# Patient Record
Sex: FEMALE | Race: BLACK OR AFRICAN AMERICAN | NOT HISPANIC OR LATINO | URBAN - NONMETROPOLITAN AREA
[De-identification: names, ages, dates, MRNs, and addresses within clinical notes are randomized per-mention and may not be internally consistent; named-entity substitution may affect disease eponyms.]

---

## 2020-06-02 ENCOUNTER — IMPORTED ENCOUNTER (OUTPATIENT)
Dept: URBAN - NONMETROPOLITAN AREA CLINIC 1 | Facility: CLINIC | Age: 70
End: 2020-06-02

## 2020-06-02 PROBLEM — Z96.1: Noted: 2020-06-02

## 2020-06-02 PROBLEM — H16.223: Noted: 2020-06-02

## 2020-06-02 PROBLEM — H10.423: Noted: 2020-06-02

## 2020-06-02 PROBLEM — Z83.511: Noted: 2020-06-02

## 2020-06-02 PROBLEM — H35.373: Noted: 2020-06-02

## 2020-06-02 PROBLEM — H52.4: Noted: 2020-06-02

## 2020-06-02 PROBLEM — H26.493: Noted: 2020-06-02

## 2020-06-02 PROCEDURE — 92015 DETERMINE REFRACTIVE STATE: CPT

## 2020-06-02 PROCEDURE — 92012 INTRM OPH EXAM EST PATIENT: CPT

## 2020-06-02 NOTE — PATIENT DISCUSSION
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 -  Continue ATs PRN OU-  Continue to monitor Pseudophakia OU w/ PCO OU: -  Discussed diagnosis in detail with patient-  PCO noted but not visually significant at this time. -  BAT done previously: 20/20 OU -  Signs/symptoms of progression discussed. -  Continue to monitorERM OU- Discussed diagnosis in detail with patient- Trace ERM noted OU new finding today- Will discuss AG use in the future. Would like to obtain baseline OCT- Continue to monitor Family hx of glaucoma in Father:-  Educated patient on findings. -  Her IOPs are consistent/stable. -  She is noted to have disc asymetry but I do not feel a great concern that she will develop glaucoma at this time. -  Will continue to monitor as necessary. Ocular Allergies OU:-  Educated patient on condition today. -  Advised to use arfitical tears PRN samples of Sytand Ultra given today. -  Continue Bepreve OU BID PRN.  -  Continue to monitor yearly or prnPresbyopia-  Discussed refractive status in detail w/ pt today -  New GLRx given but not needed-  Continue to monitor; 's Notes: MR 1/24/18DFE 1/24/18OCT

## 2021-06-03 ENCOUNTER — IMPORTED ENCOUNTER (OUTPATIENT)
Dept: URBAN - NONMETROPOLITAN AREA CLINIC 1 | Facility: CLINIC | Age: 71
End: 2021-06-03

## 2021-06-03 PROBLEM — H52.4: Noted: 2021-06-03

## 2021-06-03 PROBLEM — H10.423: Noted: 2021-06-03

## 2021-06-03 PROBLEM — H35.373: Noted: 2021-06-03

## 2021-06-03 PROBLEM — Z96.1: Noted: 2021-06-03

## 2021-06-03 PROBLEM — H16.223: Noted: 2021-06-03

## 2021-06-03 PROBLEM — H26.493: Noted: 2021-06-03

## 2021-06-03 PROBLEM — Z83.511: Noted: 2021-06-03

## 2021-06-03 PROCEDURE — 92134 CPTRZ OPH DX IMG PST SGM RTA: CPT

## 2021-06-03 PROCEDURE — 92015 DETERMINE REFRACTIVE STATE: CPT

## 2021-06-03 PROCEDURE — 99213 OFFICE O/P EST LOW 20 MIN: CPT

## 2021-06-03 NOTE — PATIENT DISCUSSION
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 -  Continue ATs PRN OU-  Continue to monitor Pseudophakia OU w/ PCO OU: -  Discussed diagnosis in detail with patient-  PCO noted but not visually significant at this time. -  BAT done previously: 20/20 OU -  Continue to monitorERM OU- Discussed diagnosis in detail with patient- Trace ERM noted OU new finding today- Will discuss AG use in the future. - OCT domo today shows ERM OU but stable at this time - Continue to monitor Family hx of glaucoma in Father:-  Educated patient on findings. -  Her IOPs are consistent/stable. -  She is noted to have disc asymetry but I do not feel a great concern that she will develop glaucoma at this time. -  Will continue to monitor as necessary. Ocular Allergies OU:-  Educated patient on condition today. -  Advised to use arfitical tears PRN samples of Sytand Ultra given today. -  Continue Bepreve OU BID PRN.  -  Continue to monitor yearly or prnPresbyopia-  Discussed refractive status in detail w/ pt today -  New GLRx given today-  Continue to monitor; 's Notes: MR 1/24/18DFE 1/24/18OCT

## 2022-04-09 ASSESSMENT — VISUAL ACUITY
OD_CC: 20/25
OS_CC: 20/25-2
OD_GLARE: 20/50-
OS_GLARE: 20/200
OD_GLARE: 20/100
OS_CC: 20/22+
OS_GLARE: 20/100
OD_CC: 20/22+

## 2022-04-09 ASSESSMENT — TONOMETRY
OS_IOP_MMHG: 16
OS_IOP_MMHG: 14
OD_IOP_MMHG: 14
OD_IOP_MMHG: 13

## 2022-07-13 ENCOUNTER — FOLLOW UP (OUTPATIENT)
Dept: URBAN - NONMETROPOLITAN AREA CLINIC 1 | Facility: CLINIC | Age: 72
End: 2022-07-13

## 2022-07-13 DIAGNOSIS — H52.4: ICD-10-CM

## 2022-07-13 PROCEDURE — 92014 COMPRE OPH EXAM EST PT 1/>: CPT

## 2022-07-13 PROCEDURE — 92015 DETERMINE REFRACTIVE STATE: CPT

## 2022-07-13 ASSESSMENT — VISUAL ACUITY
OD_CC: 20/20-2
OS_CC: 20/20-2

## 2022-07-13 ASSESSMENT — TONOMETRY
OS_IOP_MMHG: 16
OD_IOP_MMHG: 17

## 2023-06-07 NOTE — PATIENT DISCUSSION
Cataract(s) are not yet visually significant to the patient. Recommend monitoring, and patient agrees with this plan. medicine Arabella

## 2023-07-14 ENCOUNTER — ESTABLISHED PATIENT (OUTPATIENT)
Dept: URBAN - NONMETROPOLITAN AREA CLINIC 1 | Facility: CLINIC | Age: 73
End: 2023-07-14

## 2023-07-14 DIAGNOSIS — H35.373: ICD-10-CM

## 2023-07-14 DIAGNOSIS — H16.223: ICD-10-CM

## 2023-07-14 DIAGNOSIS — H52.4: ICD-10-CM

## 2023-07-14 PROCEDURE — 92015 DETERMINE REFRACTIVE STATE: CPT

## 2023-07-14 PROCEDURE — 92014 COMPRE OPH EXAM EST PT 1/>: CPT

## 2023-07-14 ASSESSMENT — KERATOMETRY
OD_K2POWER_DIOPTERS: 35.25
OS_AXISANGLE_DEGREES: 24
OS_AXISANGLE2_DEGREES: 114
OD_K1POWER_DIOPTERS: 44.50
OD_AXISANGLE_DEGREES: 22
OS_K1POWER_DIOPTERS: 43.75
OD_AXISANGLE2_DEGREES: 112
OS_K2POWER_DIOPTERS: 42.75

## 2023-07-14 ASSESSMENT — VISUAL ACUITY
OU_CC: 20/20-1
OU_CC: J1+
OD_CC: 20/20-1
OS_CC: 20/30+1

## 2023-07-14 ASSESSMENT — TONOMETRY
OD_IOP_MMHG: 16
OS_IOP_MMHG: 16

## 2024-01-16 ENCOUNTER — FOLLOW UP (OUTPATIENT)
Dept: URBAN - NONMETROPOLITAN AREA CLINIC 1 | Facility: CLINIC | Age: 74
End: 2024-01-16

## 2024-01-16 DIAGNOSIS — H35.373: ICD-10-CM

## 2024-01-16 DIAGNOSIS — H52.4: ICD-10-CM

## 2024-01-16 DIAGNOSIS — H16.223: ICD-10-CM

## 2024-01-16 PROCEDURE — 99213 OFFICE O/P EST LOW 20 MIN: CPT

## 2024-01-16 PROCEDURE — 92134 CPTRZ OPH DX IMG PST SGM RTA: CPT

## 2024-01-16 ASSESSMENT — VISUAL ACUITY
OD_CC: 20/30-2
OD_BAT: 20/40-2
OS_BAT: 20/50
OS_CC: 20/30-1
OU_CC: 20/30

## 2024-01-16 ASSESSMENT — TONOMETRY
OS_IOP_MMHG: 16
OD_IOP_MMHG: 16

## 2025-01-17 ENCOUNTER — FOLLOW UP (OUTPATIENT)
Age: 75
End: 2025-01-17

## 2025-01-17 DIAGNOSIS — H35.373: ICD-10-CM

## 2025-01-17 DIAGNOSIS — H52.4: ICD-10-CM

## 2025-01-17 DIAGNOSIS — H26.493: ICD-10-CM

## 2025-01-17 DIAGNOSIS — H16.223: ICD-10-CM

## 2025-01-17 DIAGNOSIS — Z96.1: ICD-10-CM

## 2025-01-17 PROCEDURE — 99213 OFFICE O/P EST LOW 20 MIN: CPT

## 2025-01-17 PROCEDURE — 92015 DETERMINE REFRACTIVE STATE: CPT

## 2025-07-01 ENCOUNTER — EMERGENCY VISIT (OUTPATIENT)
Age: 75
End: 2025-07-01

## 2025-07-01 DIAGNOSIS — H11.32: ICD-10-CM

## 2025-07-01 PROCEDURE — 99213 OFFICE O/P EST LOW 20 MIN: CPT
